# Patient Record
Sex: FEMALE | Race: WHITE
[De-identification: names, ages, dates, MRNs, and addresses within clinical notes are randomized per-mention and may not be internally consistent; named-entity substitution may affect disease eponyms.]

---

## 2018-07-06 ENCOUNTER — HOSPITAL ENCOUNTER (OUTPATIENT)
Dept: HOSPITAL 25 - ED | Age: 58
Discharge: ANOTHER HOSPITAL | End: 2018-07-06
Attending: INTERNAL MEDICINE
Payer: COMMERCIAL

## 2018-07-06 VITALS — SYSTOLIC BLOOD PRESSURE: 97 MMHG | DIASTOLIC BLOOD PRESSURE: 70 MMHG

## 2018-07-06 DIAGNOSIS — I21.11: Primary | ICD-10-CM

## 2018-07-06 DIAGNOSIS — I34.0: ICD-10-CM

## 2018-07-06 DIAGNOSIS — I50.1: ICD-10-CM

## 2018-07-06 DIAGNOSIS — R00.0: ICD-10-CM

## 2018-07-06 DIAGNOSIS — Z72.0: ICD-10-CM

## 2018-07-06 DIAGNOSIS — I46.2: ICD-10-CM

## 2018-07-06 DIAGNOSIS — J96.01: ICD-10-CM

## 2018-07-06 LAB
BASOPHILS # BLD AUTO: 0.1 10^3/UL (ref 0–0.2)
EOSINOPHIL # BLD AUTO: 0.1 10^3/UL (ref 0–0.6)
HCT VFR BLD AUTO: 37 % (ref 35–47)
HGB BLD-MCNC: 12.1 G/DL (ref 12–16)
INR PPP/BLD: 1.31 (ref 0.77–1.02)
LYMPHOCYTES # BLD AUTO: 5.2 10^3/UL (ref 1–4.8)
MCH RBC QN AUTO: 30 PG (ref 27–31)
MCHC RBC AUTO-ENTMCNC: 33 G/DL (ref 31–36)
MCV RBC AUTO: 91 FL (ref 80–97)
MONOCYTES # BLD AUTO: 1.6 10^3/UL (ref 0–0.8)
NEUTROPHILS # BLD AUTO: 12.2 10^3/UL (ref 1.5–7.7)
NRBC # BLD AUTO: 0 10^3/UL
NRBC BLD QL AUTO: 0.1
PLATELET # BLD AUTO: 348 10^3/UL (ref 150–450)
RBC # BLD AUTO: 4.05 10^6/UL (ref 4–5.4)
WBC # BLD AUTO: 19.2 10^3/UL (ref 3.5–10.8)

## 2018-07-06 PROCEDURE — C1769 GUIDE WIRE: HCPCS

## 2018-07-06 PROCEDURE — 84484 ASSAY OF TROPONIN QUANT: CPT

## 2018-07-06 PROCEDURE — 85025 COMPLETE CBC W/AUTO DIFF WBC: CPT

## 2018-07-06 PROCEDURE — 71045 X-RAY EXAM CHEST 1 VIEW: CPT

## 2018-07-06 PROCEDURE — 36415 COLL VENOUS BLD VENIPUNCTURE: CPT

## 2018-07-06 PROCEDURE — 80053 COMPREHEN METABOLIC PANEL: CPT

## 2018-07-06 PROCEDURE — C1725 CATH, TRANSLUMIN NON-LASER: HCPCS

## 2018-07-06 PROCEDURE — 86140 C-REACTIVE PROTEIN: CPT

## 2018-07-06 PROCEDURE — 85347 COAGULATION TIME ACTIVATED: CPT

## 2018-07-06 PROCEDURE — 85610 PROTHROMBIN TIME: CPT

## 2018-07-06 PROCEDURE — 99285 EMERGENCY DEPT VISIT HI MDM: CPT

## 2018-07-06 PROCEDURE — 85379 FIBRIN DEGRADATION QUANT: CPT

## 2018-07-06 PROCEDURE — 83880 ASSAY OF NATRIURETIC PEPTIDE: CPT

## 2018-07-06 PROCEDURE — 83605 ASSAY OF LACTIC ACID: CPT

## 2018-07-06 PROCEDURE — 93005 ELECTROCARDIOGRAM TRACING: CPT

## 2018-07-06 PROCEDURE — C1887 CATHETER, GUIDING: HCPCS

## 2018-07-06 NOTE — ED
Jeri PINEDA Rebecca, scribed for Jah Jensen MD on 07/06/18 at 1753 .





Shortness of Breath





- HPI Summary


HPI Summary: 


Pt is a 59 y/o F BIBA who presents to ED c/o severe SOB since 1 hour PTA. Per 

EMS, en route she refused monitor, oxygen, and any intervention. In the room 

during evaluation, her O2 saturation is between 48 and 57% on RA, heart rate is 

tachycardic, between 145 and 155 bpm, with respiratory rate between 25 and 42. 

Additionally notes indigestion a few hours ago which lasted about 30 minutes 

and traveled into the jaw. Currently, is not having any pain. When asked if she 

feels palpitations, pt reports so-so. Unsure if present edema is acute or 

chronic. SHx current smoker and she denies any PMHx. PCP is Dr. Braga. 





- History of Current Complaint


Chief Complaint: EDChestPainROMI


Hx Obtained From: Patient, EMS


Onset/Duration: Lasting Hours - 1 hour, Still Present


Current Severity: Severe


Dyspnea At: Rest


Associated Signs & Symptoms: Edema





- Allergy/Home Medications


Home Medications: 


 Home Medications





Unobtainable  07/06/18 [History Confirmed 07/06/18]











PMH/Surg Hx/FS Hx/Imm Hx


Sensory History: 


   Denies: Hx Legally Blind


EENT History: 


   Denies: Hx Deafness


Infectious Disease History: No


Infectious Disease History: 


   Denies: Traveled Outside the [place] in Last 30 Days





- Family History


Known Family History: Positive: Diabetes





- Social History


Occupation: Employed Full-time - [employer]


Smoking Status (MU): Current Every Day Smoker





Review of Systems


Positive: Palpitations


Positive: Shortness Of Breath


Positive: Other - "indigestion" - resolved


Positive: Edema


All Other Systems Reviewed And Are Negative: Yes





Physical Exam





- Summary


Physical Exam Summary: 


Appearance: On arrival, the pt is tachypneic, tachycardic and sitting bolt 

upright


 


Skin: The skin is pale and diaphoretic


 


HEENT: The head is normocephalic and atraumatic. The pupils are equal and 

reactive. The conjunctivae are clear and without drainage. Nares are patent and 

without drainage. Mouth reveals moist mucous membranes and the throat is 

without erythema and exudate. The external ears are intact. The ear canals are 

patent and without drainage. The tympanic membranes are intact.





Respiratory: Coarse crackles are heard anteriorly, but posteriorly her lungs 

are clear to auscultation, she has slight pulmonary edema


 


Cardiovascular: Heart is tachycardic. There is no murmur or rub auscultated. 

There is slight pulmonary edema


 


Musculoskeletal: There is no back tenderness noted. Extremities are non-tender 

with full range of motion. There is good capillary refill. 


 


Neurological: Patient is alert and oriented to person, place and time. The 

patient has symmetrical motor strength in all four extremities.


 


Psychiatric: The patient has an appropriate affect and does not exhibit any 

anxiety or depression.





Triage Information Reviewed: Yes


Vital Signs On Initial Exam: 


 Initial Vitals











Temp Pulse Resp BP Pulse Ox


 


 95.5 F   125   26   125/105   74 


 


 07/06/18 17:44  07/06/18 17:44  07/06/18 17:44  07/06/18 17:44  07/06/18 17:44











Vital Signs Reviewed: Yes





Procedures





- Intubation


Intubation Method: orotracheal


Tube Size (cm): 7.5


Breath Sounds after Intubation: equal


Intubation Complications: oral-unsuccessful attempt -  Initial attempt with 

laryngoscope.  Second attempt with Glidescope was easily obtained.


Post Intubation Xray: Yes - Tube OK





Diagnostics





- Vital Signs


 Vital Signs











  Temp Pulse Resp BP Pulse Ox


 


 07/06/18 17:44  95.5 F  125  26  125/105  74














- Laboratory


Lab Results: 


 Lab Results











  07/06/18 07/06/18 07/06/18 Range/Units





  18:25 18:25 18:25 


 


WBC  19.2 H    (3.5-10.8)  10^3/ul


 


RBC  4.05    (4.00-5.40)  10^6/ul


 


Hgb  12.1    (12.0-16.0)  g/dl


 


Hct  37    (35-47)  %


 


MCV  91    (80-97)  fL


 


MCH  30    (27-31)  pg


 


MCHC  33    (31-36)  g/dl


 


RDW  15    (10.5-15)  %


 


Plt Count  348    (150-450)  10^3/ul


 


MPV  8.4    (7.4-10.4)  um3


 


Neut % (Auto)  63.8    (38-83)  %


 


Lymph % (Auto)  27.1    (25-47)  %


 


Mono % (Auto)  8.2 H    (0-7)  %


 


Eos % (Auto)  0.6    (0-6)  %


 


Baso % (Auto)  0.3    (0-2)  %


 


Absolute Neuts (auto)  12.2 H    (1.5-7.7)  10^3/ul


 


Absolute Lymphs (auto)  5.2 H    (1.0-4.8)  10^3/ul


 


Absolute Monos (auto)  1.6 H    (0-0.8)  10^3/ul


 


Absolute Eos (auto)  0.1    (0-0.6)  10^3/ul


 


Absolute Basos (auto)  0.1    (0-0.2)  10^3/ul


 


Absolute Nucleated RBC  0    10^3/ul


 


Nucleated RBC %  0.1    


 


INR (Anticoag Therapy)   1.31 H   (0.77-1.02)  


 


D-Dimer, Quantitative   960 H   (Less Than 230)  ng/mL


 


Sodium    134 L  (135-145)  mmol/L


 


Potassium    3.8  (3.5-5.0)  mmol/L


 


Chloride    101  (101-111)  mmol/L


 


Carbon Dioxide    15 L  (22-32)  mmol/L


 


Anion Gap    18 H  (2-11)  mmol/L


 


BUN    19  (6-24)  mg/dL


 


Creatinine    1.16 H  (0.51-0.95)  mg/dL


 


Est GFR ( Amer)    58.5  (>60)  


 


Est GFR (Non-Af Amer)    48.3  (>60)  


 


BUN/Creatinine Ratio    16.4  (8-20)  


 


Glucose    425 H  ()  mg/dL


 


Lactic Acid     (0.5-2.0)  mmol/L


 


Calcium    8.5 L  (8.6-10.3)  mg/dL


 


Total Bilirubin    0.50  (0.2-1.0)  mg/dL


 


AST    67 H  (13-39)  U/L


 


ALT    77 H  (7-52)  U/L


 


Alkaline Phosphatase    137 H  ()  U/L


 


Troponin I    3.65 H*  (<0.04)  ng/mL


 


C-Reactive Protein    81.35 H  (<8.01)  mg/L


 


B-Natriuretic Peptide    ( - 100) pg/mL


 


Total Protein    6.5  (6.4-8.9)  g/dL


 


Albumin    3.3  (3.2-5.2)  g/dL


 


Globulin    3.2  (2-4)  g/dL


 


Albumin/Globulin Ratio    1.0  (1-3)  














  07/06/18 07/06/18 Range/Units





  18:25 18:25 


 


WBC    (3.5-10.8)  10^3/ul


 


RBC    (4.00-5.40)  10^6/ul


 


Hgb    (12.0-16.0)  g/dl


 


Hct    (35-47)  %


 


MCV    (80-97)  fL


 


MCH    (27-31)  pg


 


MCHC    (31-36)  g/dl


 


RDW    (10.5-15)  %


 


Plt Count    (150-450)  10^3/ul


 


MPV    (7.4-10.4)  um3


 


Neut % (Auto)    (38-83)  %


 


Lymph % (Auto)    (25-47)  %


 


Mono % (Auto)    (0-7)  %


 


Eos % (Auto)    (0-6)  %


 


Baso % (Auto)    (0-2)  %


 


Absolute Neuts (auto)    (1.5-7.7)  10^3/ul


 


Absolute Lymphs (auto)    (1.0-4.8)  10^3/ul


 


Absolute Monos (auto)    (0-0.8)  10^3/ul


 


Absolute Eos (auto)    (0-0.6)  10^3/ul


 


Absolute Basos (auto)    (0-0.2)  10^3/ul


 


Absolute Nucleated RBC    10^3/ul


 


Nucleated RBC %    


 


INR (Anticoag Therapy)    (0.77-1.02)  


 


D-Dimer, Quantitative    (Less Than 230)  ng/mL


 


Sodium    (135-145)  mmol/L


 


Potassium    (3.5-5.0)  mmol/L


 


Chloride    (101-111)  mmol/L


 


Carbon Dioxide    (22-32)  mmol/L


 


Anion Gap    (2-11)  mmol/L


 


BUN    (6-24)  mg/dL


 


Creatinine    (0.51-0.95)  mg/dL


 


Est GFR ( Amer)    (>60)  


 


Est GFR (Non-Af Amer)    (>60)  


 


BUN/Creatinine Ratio    (8-20)  


 


Glucose    ()  mg/dL


 


Lactic Acid  5.7 H*   (0.5-2.0)  mmol/L


 


Calcium    (8.6-10.3)  mg/dL


 


Total Bilirubin    (0.2-1.0)  mg/dL


 


AST    (13-39)  U/L


 


ALT    (7-52)  U/L


 


Alkaline Phosphatase    ()  U/L


 


Troponin I    (<0.04)  ng/mL


 


C-Reactive Protein    (<8.01)  mg/L


 


B-Natriuretic Peptide   456 H ( - 100) pg/mL


 


Total Protein    (6.4-8.9)  g/dL


 


Albumin    (3.2-5.2)  g/dL


 


Globulin    (2-4)  g/dL


 


Albumin/Globulin Ratio    (1-3)  











Result Diagrams: 


 07/06/18 18:25





 07/06/18 18:25


Lab Statement: Any lab studies that have been ordered have been reviewed, and 

results considered in the medical decision making process.





- Radiology


  ** CXR


Radiology Interpretation Completed By: Radiologist - 1. Appropriate positioning 

of the endotracheal tube. 2. Chest x-ray findings could be compatible with 

cardiogenic pulmonary edema, pneumonitis or ARDS depending on the patient's 

clinical presentation. Physician reviewed this report.





- EKG


  ** 1754


Cardiac Rate: Tachycardia - 146 bpm


EKG Interpretation: Inferior wall MI with scattered reciprocal changes





Course/Dx





- Course


Course Of Treatment: Ms. Machuca presented in extremis.  She was tachycardic, 

tachypneic and clearly in distress.  On the monitor she was noted to have a 

irregularly tachycardic rhythm, low pulse ox and hypertension.  We tried to 

place her on 100% oxygen but she resisted.  I was able to get her to hold it 

somewhat close to her mouth and this did improve her pulse ox significantly 

however it was still barely 80%.  Although her lungs posteriorly sounded 

reasonably clear she clearly had crackles anteriorly.  EKG was being obtained 

and a nitroglycerin drip was being hung when she suddenly began to bradycardia 

down and become unresponsive.  An ABC alert was called at that point and CPR 

was started for no pulses.  She was intubated and given a round of epinephrine 

and recovered pulses.  EKG at that time shows an acute inferior MI with 

reciprocal changes.  A STEMI alert was called.  She was still hypertensive and 

although she has an inferior MI nitroglycerin was started tentatively.  She was 

given STEMI medications and the STEMI team took her to the Cath Lab.  Her labs 

returned with a positive troponin of 3.9 and a BNP of 500.  Her chest x-ray 

showed pulmonary edema and an ET tube in the correct position.





- Diagnoses


Provider Diagnoses: 


 Acute ST elevation myocardial infarction (STEMI) of inferior wall, Flash 

pulmonary edema, Cardiopulmonary arrest





During the Visit The Following Alert/Code Occurred: STEMI - 1755, ABC Alert - 

1747





- Physician Notifications


Discussed Care of Patient With: Wiliam Srivastava


Time Discussed With Above Provider: 18:01


Instructed by Provider To: Other - On his way, wants an NG tube, urinary 

catheter, Haperin and 180 of Brilinta





- Critical Care Time


Critical Care Time: 30-74 min - 45 minutes





Discharge





- Sign-Out/Discharge


Documenting (check all that apply): Discharge/Admit/Transfer - Admit





- Discharge Plan


Condition: Critical


Disposition: ADMITTED TO [facility]





- Billing Disposition and Condition


Condition: CRITICAL


Disposition: Admitted to [facility]





The documentation as recorded by the Jeri mosley Rebecca accurately 

reflects the service I personally performed and the decisions made by me, Jah Jensen MD.

## 2018-07-06 NOTE — RAD
INDICATION: Endotracheal tube placement in a patient with shortness of breath.



COMPARISON: None.

 

TECHNIQUE: Single AP portable view of the chest was obtained.



FINDINGS: 



Image quality is compromised due to the relative inferiority of a portable chest x-ray.



The endotracheal tube tip is seen just below the level of the inferior margin of the

clavicular heads.



There is a mild degree of cardiomegaly.



There are patchy densities obscuring the bilateral lungs.



Visualized bones are normal for the patient's age.



IMPRESSION:  

1. Appropriate positioning of the endotracheal tube.

2. Chest x-ray findings could be compatible with cardiogenic pulmonary edema, pneumonitis

or ARDS depending on the patient's clinical presentation.

## 2018-07-06 NOTE — CONSULT
Consult


Consult: 





CCM consult note


Date of consult : 7/6/18


Consultation requested by: Dr Wiliam Srivastava


Reason for consult: Hypotension, airway management





CC: SOB





HPI: 58 y o obese f, current smoker with no other known PMHx, was brought in by 

EMS for SOB. Pt had indigestion 1 hr prior to presentation. Pt was found to be 

hypoxic, tachypneic, diaphoretic in ED. Her O2 sat was in 60s. She was able to 

provide minimal history. She quickly deteriorated, ABC alert was called. She 

was found to have STEMI and was quickly taken to cath lab. She was intubated, 

was started on Levophed drip. She was sedated with Propofol. She was noted to 

have frothy secretions in ETT. Patient was seen in cath lab.


No other history obtained from patient. As per family, pt was c/o SOB and not 

feeling well when mowing lawn 3 days ago.


Pt had cath showing severe MR, stents couldnot be placed. 


Pt had been on 30mcg of Propofol, rceived 50+50+50 cc bolus and 50+50 of 

Fentanyl during cath. She has been CMV with target tidal volume of 450cc, PEEP 5

, FiO2 100%.





CXR showed pulm edema


 Active Medications











Generic Name Dose Route Start Last Admin





  Trade Name Freq  PRN Reason Stop Dose Admin


 


Fentanyl Citrate  20 mls @ 0.5 mls/hr  07/06/18 20:30  





  Fentanyl Pca*  PCA   





  .change Q24H RENAN   





     





     





  Protocol   





  25 MCG/HR   








 Vital Signs











Temp Pulse Resp BP Pulse Ox


 


 95.5 F   111   28   97/70   95 


 


 07/06/18 17:44  07/06/18 18:30  07/06/18 18:23  07/06/18 18:30  07/06/18 18:26








 Laboratory Results - last 24 hr











  07/06/18 07/06/18 07/06/18





  18:25 18:25 18:25


 


WBC  19.2 H  


 


RBC  4.05  


 


Hgb  12.1  


 


Hct  37  


 


MCV  91  


 


MCH  30  


 


MCHC  33  


 


RDW  15  


 


Plt Count  348  


 


MPV  8.4  


 


Neut % (Auto)  63.8  


 


Lymph % (Auto)  27.1  


 


Mono % (Auto)  8.2 H  


 


Eos % (Auto)  0.6  


 


Baso % (Auto)  0.3  


 


Absolute Neuts (auto)  12.2 H  


 


Absolute Lymphs (auto)  5.2 H  


 


Absolute Monos (auto)  1.6 H  


 


Absolute Eos (auto)  0.1  


 


Absolute Basos (auto)  0.1  


 


Absolute Nucleated RBC  0  


 


Nucleated RBC %  0.1  


 


INR (Anticoag Therapy)   1.31 H 


 


D-Dimer, Quantitative   960 H 


 


Sodium    134 L


 


Potassium    3.8


 


Chloride    101


 


Carbon Dioxide    15 L


 


Anion Gap    18 H


 


BUN    19


 


Creatinine    1.16 H


 


Est GFR ( Amer)    58.5


 


Est GFR (Non-Af Amer)    48.3


 


BUN/Creatinine Ratio    16.4


 


Glucose    425 H


 


POC Glucose (mg/dL)   


 


Lactic Acid   


 


Calcium    8.5 L


 


Total Bilirubin    0.50


 


AST    67 H


 


ALT    77 H


 


Alkaline Phosphatase    137 H


 


Troponin I    3.65 H*


 


C-Reactive Protein    81.35 H


 


B-Natriuretic Peptide   


 


Total Protein    6.5


 


Albumin    3.3


 


Globulin    3.2


 


Albumin/Globulin Ratio    1.0














  07/06/18 07/06/18 07/06/18





  18:25 18:25 21:14


 


WBC   


 


RBC   


 


Hgb   


 


Hct   


 


MCV   


 


MCH   


 


MCHC   


 


RDW   


 


Plt Count   


 


MPV   


 


Neut % (Auto)   


 


Lymph % (Auto)   


 


Mono % (Auto)   


 


Eos % (Auto)   


 


Baso % (Auto)   


 


Absolute Neuts (auto)   


 


Absolute Lymphs (auto)   


 


Absolute Monos (auto)   


 


Absolute Eos (auto)   


 


Absolute Basos (auto)   


 


Absolute Nucleated RBC   


 


Nucleated RBC %   


 


INR (Anticoag Therapy)   


 


D-Dimer, Quantitative   


 


Sodium   


 


Potassium   


 


Chloride   


 


Carbon Dioxide   


 


Anion Gap   


 


BUN   


 


Creatinine   


 


Est GFR ( Amer)   


 


Est GFR (Non-Af Amer)   


 


BUN/Creatinine Ratio   


 


Glucose   


 


POC Glucose (mg/dL)    293 H


 


Lactic Acid  5.7 H*  


 


Calcium   


 


Total Bilirubin   


 


AST   


 


ALT   


 


Alkaline Phosphatase   


 


Troponin I   


 


C-Reactive Protein   


 


B-Natriuretic Peptide   456 H 


 


Total Protein   


 


Albumin   


 


Globulin   


 


Albumin/Globulin Ratio   








O/E: Pt is sedated, was agitated until propofol was increased and Fenatnyl was 

administered


HEENT: ETT, NGT+


Lungs: Crackles present, rhonchi +


CVS: S1, S2+, tachycardic


Abd: Obese, BS+


Ext: No edema


Neuro: Sedated, no focal defecits as per ED physician





I/R: 58 y o f presented with SOB, hypoxic, tachypneic, tachycardic, had cardiac 

arrest, found to have STEMI s/p cath


Pt was intubated in ED for acute hypoxic resp failure sec to pulm edema


1. STEMI


2. Acute hypoxic resp failure sec to pulm edema


3. Hypotension sec to cardiogenic shock





Pt had cardiac cath, found to have severe MR requiring surgical intervention 

[facility]


Pt had intra aortic balloon pump placed


Pt sedated on Propofol and Fentanyl for pain


Vent mechanics acceptable, maintaining good tidal volumes


O2 sat 100%


5 mg Morphine was given for pulm edema


Bl sugar was 450 on arrival, rpt bl sugar at bedside 220


IVF at 50cc/hr


Received Heparin, ASA, Brilinta.





Pt being transferred

## 2018-07-07 NOTE — CATH
CARDIAC CATHETERIZATION REPORT:

 

DATE OF PROCEDURE:  07/06/18 - [facility] CATH

 

INDICATION FOR THE PROCEDURE:  The patient with EKG suggesting acute ST segment 
elevation inferior posterior wall myocardial infarction with cardiac arrest 
with the patient intubated.

 

PROCEDURE:  Coronary arteriography, left heart catheterization, left 
ventriculography, intraaortic balloon pump placement, attempts at opening 
totally occluded distal right coronary artery.

 

The patient was examined in the emergency room.  Of note, the patient was 
already intubated and history was obtained from the emergency room physician.  
Family member was present and the necessity of proceeding to the cardiovascular 
laboratory was explained to the family member and they agreed with this and as 
such, the patient was brought to the cardiovascular laboratory.

 

In the cardiovascular laboratory, a formal time-out was performed.  The patient 
was prepped and draped in a sterile fashion.  The right groin area was 
anesthetized with 1% lidocaine and the femoral artery was cannulated utilizing 
Seldinger technique.

 

Laboratory results prior to cardiac catheterization pending at the time of 
emergent cardiac catheterization.

 

EQUIPMENT UTILIZED:

1.  Right femoral artery sheath - 6.5-French Merit's Prelude sheath.

2.  Diagnostic coronary catheters FL4 and FR4 curved 5-French catheters.

3.  Diagnostic wire utilized for exchanging catheter was a J-tip exchange 260 
length catheter.

4.  Left heart catheterization catheter with a 5-French angled pigtail catheter.

5.  Guide catheter was an ART 6-French with side holes.  Guide wires utilized 
were All Star regular length, All Star exchange length and Whisper exchange 
length with a over-the-wire 2.0 x 12 mm long balloon for support.

6.  Intraaortic balloon catheter with a 30 cc Arrow RediGuard intraaortic 
balloon.

 

MEDICATIONS GIVEN:

1.  Levophed titrated for blood pressure control.  Propofol for sedation.  
Fentanyl under Dr. Covington's - intensivist guidance.

2.  Angiomax bolus and an Angiomax drip.  The patient had received 4000 units 
of heparin in the emergency room and 180 mg of Brilinta and an aspirin via the 
NG- tube.  Morphine was also given as well.  At the end of the case, the 
Angiomax drip was stopped and a heparin drip at 1000 units an hour was started.

 

DESCRIPTION OF PROCEDURE:  The patient was brought to the cardiovascular 
laboratory where a formal time-out was performed.  She was prepped and draped 
in a sterile fashion.  Right groin area was anesthetized with 1% lidocaine.  
Right femoral artery was cannulated and a Merit sheath was placed.  Coronary 
arteriography was performed using the diagnostic catheters.  Central aortic 
pressure was recorded using an angled pigtail catheter advanced to the 
ascending aorta.  The catheter was then passed through the aortic valve into 
the left ventricle with left ventricular pressure was recorded.  The left 
ventriculography was performed utilizing a total of 36 cc of Visipaque dye at 
the rate of 12 cc per second.  The catheter was pulled back across the aortic 
valve to recheck gradient.  Following this, Angiomax bolus was given after a CT 
was found to be subtherapeutic.  Angiomax drips started and the guiding views 
were obtained utilizing the ART guide catheter.  Attempts were made to cross 
the total occlusion utilizing initially the Abbott All Star regular length wire 
followed by the exchange length and eventually a Whisper wire with balloon 
support utilizing the 2.0 x 12 mm long Emerge over-the-wire balloon. Following 
attempts at this, the decision was made to place an intraaortic balloon pump, 
which was placed via the left femoral artery after it was cannulated and sheath 
was placed.  The patient was augmenting well with balloon pump.  Further 
attempts were tried to be made to open the right coronary artery while 
arrangements were being made for transfer to a higher level of care center who 
could have option of coronary artery bypass and mitral valve replacement in 
light of severe mitral regurgitation.

 

Dr. Covington was present throughout the case for hemodynamic management as the 
intensivist and for sedation management.

 

The total contrast used was 250 cc of Visipaque dye.  The radiation exposure 
included 24.1 minutes of fluoro time.  The air kerma radiation was 3909 mGy. 
The DAP radiation was 25785 microgray per meter squared.

 

RESULTS:

 

HEMODYNAMIC DATA:  Left catheterization revealed central aortic pressure 
recorded at 86/61 with a mean of 71, left ventricular pressure 90 over left 
ventricular end diastolic pressure of 35 to 43 mmHg.  (Of note, left heart 
catheterization was performed prior to the intraaortic balloon pump being placed
).

 

CORONARY ARTERIOGRAPHY: 

   A.  Left coronary artery:

      1.  Left main.  Short in nature, widely patent.

      2.  Left anterior descending artery.  The left anterior descending artery 
had mild to moderate luminal irregularities, it extended supplying multiple 
diagonal branches and barely reached the apical region, it did not turn onto 
the inferior surface of the heart.  The degree of luminal reduction appeared to 
be utmost 25% to 30%.

      3.  Circumflex artery - a non-dominant vessel supplying a moderate sized 
first obtuse marginal branch followed by a second and third bifurcating  low-
lying obtuse marginal branch.  There were mild luminal irregularities, but no 
critical stenosis seen throughout this vessel.

     Collateral blood flow was noted through the septal perforators to what 
appeared to be faintly filling the PDA as well as collateral blood flow to what 
appeared to be a posterior left ventricular branch. 

   B.  Right coronary artery - a presumed dominant vessel of large caliber in 
its proximal portion appearing to be as much as 5.5 to 6 mm in its proximal 
segment. The mid segment then started tapering as it turned onto the inferior 
surface of the heart and just after turning onto the inferior surface, there 
was what appeared to be a complete occlusion.  Of note, collateral blood flow 
was noted after multiple injections to faintly be filling what appeared to be a 
posterior left ventricular branch of the right coronary artery.  Just after 
what appeared to be the total occlusion, there were thread-like vessels noted.  
Of note, given the fact that the total occlusion was in the distal portion of 
the right coronary artery, there were several right ventricular branches that 
were very thin in nature, but had KIRSTIE 3 flow in them supplying the right 
ventricular surface.

 

LEFT VENTRICULOGRAPHY:  

   Performed in the AZUL projection revealed preservation of the anterior wall 
and on brief views, the apical region, the inferior wall was severely hypo to 
akinetic in nature and there was severe mitral regurgitation noted.  There 
appeared to be a left atrial dilatation noted. The overall EF was estimated at 
30% to 35% (closure to 35%).

 

ATTEMPT AT INTERVENTION INTO TOTALLY OCCLUDED CORONARY ARTERY:  

   Unsuccessful attempt to reestablish blood flow to the distal right coronary 
artery despite multiple wire usage with balloon catheter support.

 

OVERALL ASSESSMENT:  

   Severe mitral regurgitation.  Moderate left ventricular systolic dysfunction 
with severe mitral regurgitation as described above. Significant coronary 
artery disease involving the right coronary artery, which most likely was a 
large vessel in general.  Collateral blood flow was seen to some degree to the 
PDA and the posterior V branch via the left coronary artery as well as right to 
right collateral blood flow. The possibility of this not being an acute closure 
of the RCA, but rather subacute (days old) after further discussion with family 
members was raised.   At this point in time, transfer to an institution that 
has the capability of bypass surgery and mitral valve replacement will be 
pursued.  Of note, multiple attempts through different centers were made.  Of 
note, [facility] eventually after some degree of time refused the 
case necessitating evaluation through the [facility] with both 
[facility] and [facility] not having intensive care units 
beds, but [facility] eventually making room for transfer by 
opening up an bed in their intensive care unit.  A discussion was had with 
their intensivist, Dr. Armenta in addition to Dr. Marcello Mora, the 
interventionalist on call as well as a phone call to Dr. Michele Varner, who is 
one of their Cardiothoracic surgeons, although not the one on call that 
evening.  A decision was made that the patient be sent to the intensive care 
unit by Dr. Mora after discussing the case with him.

 

 [id number]/[facility] #: [id number]

MTDPRAMOD

## 2018-07-07 NOTE — CONS
CC:  Dr. Darin Braga *

 

EMERGENCY ROOM INTERVENTIONAL CARDIOLOGY CONSULT NOTE AND TRANSFER SUMMARY:

 

DATE OF SERVICE:  07/06/18

 

CHIEF COMPLAINT:  The patient is a 58-year-old female who presented to the 
emergency room via ambulance with severe shortness of breath.

 

HISTORY OF PRESENT ILLNESS:  The patient is a 58-year-old white female whose 
history was briefly obtained by the emergency room physician prior to her 
suffering a cardiac arrest.  Information that was gleaned initially from the 
emergency room physician included the patient having severe shortness of breath 
for at least an hour prior to arrival.  On further discussion with the patient, 
brief in nature, she commented on some indigestion a few hours ago, which 
lasted about only 30 minutes and traveled to the [facility] Emergency [facility].  Currently in the 
emergency room, she was not having any of that discomfort.  She was noted to be 
quite tachycardic between 145 to 155 beats per minute with poor O2 saturations 
on room air.  She stated she is a current smoker.  Denied any significant past 
medical history and her family doctor was Dr. Braga.  She rapidly deteriorated 
in the emergency room after the emergency room doctor examined her, her skin 
was pale and diaphoretic and her lungs had crackles present anteriorly, but 
posteriorly they thought her lungs were clear. The heart was tachycardic.  
There was no mention of a murmur or rub.  Extremities were nontender with full 
range of motion.

 

An EKG was being obtained and a nitroglycerin drip was being hung as reportedly 
her initial blood pressure was hypertensive.  She became bradycardic and 
unresponsive and an ABC alert was called.  They had no pulses and CPR was 
started.  She was intubated, had epinephrine, and recovered pulses.  The EKG 
was then performed with findings that suggested an acute inferior wall 
myocardial infarction with reciprocal changes and a STEMI alert was called.  
Her chest x-ray showed pulmonary edema.  On my arrival in the emergency room, 
she was intubated on propofol and not communicative.  I got the history that 
was obtained initially by Dr. Jensen. Discussion was made about proceeding 
emergently to the cardiovascular laboratory on this history and EKG showing the 
acute changes.  

 

PHYSICAL EXAM:  When I saw her in the emergency room revealed, vital signs, 
blood pressure was in the 80s, pulse was 120s to 130s, tachycardic.  She was 
intubated. I could not assess JVP.  Carotids were difficult to appreciate due 
to the respirator sounds.  Lungs were coarse breath sounds bilaterally.  Her 
heart was tachycardic and there was question of a systolic murmur.  Her abdomen 
was obese. Extremities:  I did not think had significant pitting edema.  
Peripheral pulses were present distally.  Femoral pulses were deep, but 
present.  Neuro:  She was not responding on propofol due to sedation.

 

DIAGNOSTIC STUDIES/LAB DATA:  Laboratory results were pending at the time.

 

A bedside brief limited echocardiogram was performed, which showed severe hypo 
to akinesis of the inferior wall extending to the apical region.  Of note, the 
anterior mitral valve leaflet appeared to have prolapse and was redundant in 
nature, but there appeared to be an eccentric jet of mitral regurgitation.

 

In the cardiovascular laboratory, the initial images showed a totally occluded 
distal right coronary artery.  Interestingly, there was a very faint right-to-
right filling to what look a posterior left ventricular branch of the right 
coronary artery.  The left coronary artery system had no critical stenosis seen 
and, from what could I tell, there appeared to be left-to-right collateral flow 
to a PDA through the septal perforators and the distal circumflex.  Left 
ventriculography showed severe mitral regurgitation and an ejection fraction 
was approximately 30% to 35%.  Attempts were initially made across the total 
occlusion with several wires, decision was then made to interrupt the attempts 
to place an intraaortic balloon pump, which was placed on the left side with 
successful augmentation. Knowing the mitral valve had severe mitral 
regurgitation, efforts were then addressed to working on transporting the 
patient while simultaneously continuing to try to get across the totally 
occluded right coronary artery.  Eventually, this was unsuccessful and after 
much effort in trying to find a hospital who had beds available or were willing 
to take the patient, we had a bed available through [facility].
  I spoke to the intensive care physician after having spoken to Dr. Mora, 
the interventional cardiologist, who felt it was best to admit the patient to 
the intensive care unit, not directly to the cath lab for further attempts.  
Dr. Armenta eventually was able to get a bed and as such, the patient was 
transported with the intraaortic balloon pump in place, augmenting well, to 
pressures approximately 110 to 120.  The pulse had come down into the 80s to 
90s.  O2 saturation was 100% intubated.

 

Of note, during the time down in the emergency room when the patient went 
hypotensive, a Levophed drip was started and in the cath lab, it was advanced 
as needed to maintain blood pressure.  At the time of transfer, the Levophed 
drip was at 20 mcg/minute.  The patient had been on an Angiomax drip that was 
eventually switched over to heparin drip for transport to [facility] 
with the intraaortic balloon pump in place.

 

Laboratory results that came back after the patient was in the cath lab 
revealed a hemoglobin and hematocrit of 12.1 and 37, white count of 9200, 
platelet count of 348,000.  BUN and creatinine were 19 and 1.16, sodium 134, 
potassium 3.8, chloride 101, bicarb 15.  Troponin was 3.65 with a lactic acid 
of 5.7.  A BNP of 293.

 

Chest x-ray revealed congestive heart failure.

 

The initial EKG was sinus tachycardia, heart rate 146.  There was ST-segment 
elevation in II, III, aVF with small Q waves developed with minimal ST-segment 
depression in V2 with more prominent ST-segment depression in aVL.  Repeat EKG 
done in the cardiovascular laboratory revealed sinus rhythm, heart rate 94.  
There was still ST-segment elevation seen inferiorly with reciprocal change in 
aVL and in V2, although less prominent degree of ST-segment elevation compared 
to the prior EKG.

 

Further information gleaned after the cardiac catheterization when I was able 
to speak to family members who came as well, they added that the history dates 
back to at least 3 or 4 days prior to admission when the patient was apparently 
cutting her lawn and developed the acute onset of severe shortness of breath.  
It is uncertain whether her chest discomfort accompanied this.  With the 
shortness of breath, the patient felt quite ill and apparently had been sick 
for a couple of days and had missed work and called in sick for a couple of 
days.  Apparently, her administrative boss, when she came in on the day of 
presentation to the emergency room, reported her as looking blue and short of 
breath and summoned the ambulance. This would suggest that her infarction 
occurred several days ago and that more recently , she ruptured her papillary 
muscle producing the severe mitral regurgitation. 

 

At this point in time, further management will be under the guidance of 
physicians at [facility].  I have had extensive communication with Dr. Armenta in addition to Dr. Marcello Mora.  I did speak to Dr. Boubacar Varner, 
who was not on call for Cardiothoracic Surgery, but had called me multiple 
times to be updated on what was going on with the case.  All information was 
given to the physicians to try to make them a smooth transfer possible.  Of note
, there was significant delay in trying to transfer the patient due to both 
multiple centers either not accepting the patient or not having beds 
immediately available as well as transport as helicopter initially would not 
take the patient with both intubation and a balloon pump, initially telling us 
they needed a fixed-wing, which would take well over an hour before that could 
be available, with multiple further communications and eventual with [facility] able 
to be available in a rapid fashion for transfer up to [facility].

 

 [id number]/[facility] #: [id number]

MTDD